# Patient Record
Sex: MALE | Race: WHITE | NOT HISPANIC OR LATINO | URBAN - METROPOLITAN AREA
[De-identification: names, ages, dates, MRNs, and addresses within clinical notes are randomized per-mention and may not be internally consistent; named-entity substitution may affect disease eponyms.]

---

## 2017-07-02 ENCOUNTER — EMERGENCY (EMERGENCY)
Facility: HOSPITAL | Age: 42
LOS: 1 days | Discharge: HOME | End: 2017-07-02
Attending: STUDENT IN AN ORGANIZED HEALTH CARE EDUCATION/TRAINING PROGRAM
Payer: COMMERCIAL

## 2017-07-02 VITALS
HEART RATE: 68 BPM | RESPIRATION RATE: 20 BRPM | SYSTOLIC BLOOD PRESSURE: 121 MMHG | DIASTOLIC BLOOD PRESSURE: 71 MMHG | OXYGEN SATURATION: 97 % | HEIGHT: 74 IN | TEMPERATURE: 98 F | WEIGHT: 179.9 LBS

## 2017-07-02 PROCEDURE — 99284 EMERGENCY DEPT VISIT MOD MDM: CPT | Mod: 25

## 2017-07-02 RX ORDER — GABAPENTIN 400 MG/1
300 CAPSULE ORAL ONCE
Qty: 0 | Refills: 0 | Status: COMPLETED | OUTPATIENT
Start: 2017-07-02 | End: 2017-07-02

## 2017-07-02 RX ORDER — IBUPROFEN 200 MG
600 TABLET ORAL ONCE
Qty: 0 | Refills: 0 | Status: COMPLETED | OUTPATIENT
Start: 2017-07-02 | End: 2017-07-02

## 2017-07-02 NOTE — ED PROVIDER NOTE - MEDICAL DECISION MAKING DETAILS
Will check CT scan, blood work,. labs, medicate and re-eval. Will evaluate for cause of tingling in left arm.  Will obtain CT scan and re-eval.

## 2017-07-02 NOTE — ED PROVIDER NOTE - CARE PLAN
Principal Discharge DX:	Tremors of nervous system  Secondary Diagnosis:	Renal failure (ARF), acute on chronic  Secondary Diagnosis:	Hyperglycemia Principal Discharge DX:	Cervical radiculopathy

## 2017-07-02 NOTE — ED PROVIDER NOTE - CRANIAL NERVE AND PUPILLARY EXAM
Tremors to extremities (chronic) Tremors to extremities (chronic). sensation dimiinished left arm forearm downwards towards hands. Strength is symmetric cranial nerves 2-12 intact/Tremors to extremities (chronic). sensation dimiinished left arm forearm downwards towards hands. Strength is symmetric
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
## 2017-07-02 NOTE — ED PROVIDER NOTE - PROGRESS NOTE DETAILS
Patient with difficulty swallowing. Labs and CT are as noted. Will admit. Patient then began with nausea and dry heaving. Repeat Accu-Chek 79. Will do accuckecck every hour for 1-2 test. Dr. Dorota baca.

## 2017-07-03 VITALS
DIASTOLIC BLOOD PRESSURE: 65 MMHG | HEART RATE: 60 BPM | OXYGEN SATURATION: 98 % | RESPIRATION RATE: 16 BRPM | SYSTOLIC BLOOD PRESSURE: 114 MMHG | TEMPERATURE: 98 F

## 2017-07-03 PROCEDURE — 72125 CT NECK SPINE W/O DYE: CPT

## 2017-07-03 PROCEDURE — 99284 EMERGENCY DEPT VISIT MOD MDM: CPT | Mod: 25

## 2017-07-03 PROCEDURE — 72125 CT NECK SPINE W/O DYE: CPT | Mod: 26

## 2017-07-03 RX ORDER — IBUPROFEN 200 MG
1 TABLET ORAL
Qty: 20 | Refills: 0 | OUTPATIENT
Start: 2017-07-03 | End: 2017-07-08

## 2017-07-03 RX ORDER — GABAPENTIN 400 MG/1
1 CAPSULE ORAL
Qty: 15 | Refills: 0 | OUTPATIENT
Start: 2017-07-03 | End: 2017-07-08

## 2017-07-03 RX ADMIN — GABAPENTIN 300 MILLIGRAM(S): 400 CAPSULE ORAL at 00:04

## 2017-07-03 RX ADMIN — Medication 600 MILLIGRAM(S): at 00:04

## 2017-07-03 NOTE — ED ADULT NURSE REASSESSMENT NOTE - NS ED NURSE REASSESS COMMENT FT1
Pt. resting comfortably on stretcher.  S/P CT; awaiting results.  In no apparent distress, will continue to monitor.

## 2017-07-03 NOTE — ED ADULT NURSE NOTE - OBJECTIVE STATEMENT
Pt. complaining of left shoulder pain x 3 days.  Pt. states pain has been persistent sharp pain x 3 days.  Lung sounds clear and equal bilateral.  Denies chest pain.  Assist x 1 with ADL's.  In no apparent distress, will continue to monitor.

## 2017-09-16 ENCOUNTER — INPATIENT (INPATIENT)
Facility: HOSPITAL | Age: 42
LOS: 3 days | Discharge: HOME HEALTH | DRG: 93 | End: 2017-09-20
Attending: HOSPITALIST | Admitting: HOSPITALIST
Payer: COMMERCIAL

## 2017-09-16 VITALS
RESPIRATION RATE: 15 BRPM | TEMPERATURE: 98 F | DIASTOLIC BLOOD PRESSURE: 75 MMHG | HEIGHT: 74 IN | SYSTOLIC BLOOD PRESSURE: 134 MMHG | OXYGEN SATURATION: 98 % | WEIGHT: 179.9 LBS | HEART RATE: 73 BPM

## 2017-09-16 DIAGNOSIS — D64.9 ANEMIA, UNSPECIFIED: ICD-10-CM

## 2017-09-16 DIAGNOSIS — R27.0 ATAXIA, UNSPECIFIED: ICD-10-CM

## 2017-09-16 DIAGNOSIS — R29.90 UNSPECIFIED SYMPTOMS AND SIGNS INVOLVING THE NERVOUS SYSTEM: ICD-10-CM

## 2017-09-16 DIAGNOSIS — F12.10 CANNABIS ABUSE, UNCOMPLICATED: ICD-10-CM

## 2017-09-16 DIAGNOSIS — S06.9X9A UNSPECIFIED INTRACRANIAL INJURY WITH LOSS OF CONSCIOUSNESS OF UNSPECIFIED DURATION, INITIAL ENCOUNTER: ICD-10-CM

## 2017-09-16 DIAGNOSIS — F17.200 NICOTINE DEPENDENCE, UNSPECIFIED, UNCOMPLICATED: ICD-10-CM

## 2017-09-16 DIAGNOSIS — G25.2 OTHER SPECIFIED FORMS OF TREMOR: ICD-10-CM

## 2017-09-16 DIAGNOSIS — Z29.9 ENCOUNTER FOR PROPHYLACTIC MEASURES, UNSPECIFIED: ICD-10-CM

## 2017-09-16 LAB
ALBUMIN SERPL ELPH-MCNC: 4.6 G/DL — SIGNIFICANT CHANGE UP (ref 3.3–5.2)
ALP SERPL-CCNC: 64 U/L — SIGNIFICANT CHANGE UP (ref 40–120)
ALT FLD-CCNC: 13 U/L — SIGNIFICANT CHANGE UP
AMPHET UR-MCNC: NEGATIVE — SIGNIFICANT CHANGE UP
ANION GAP SERPL CALC-SCNC: 13 MMOL/L — SIGNIFICANT CHANGE UP (ref 5–17)
APPEARANCE UR: CLEAR — SIGNIFICANT CHANGE UP
AST SERPL-CCNC: 17 U/L — SIGNIFICANT CHANGE UP
BACTERIA # UR AUTO: ABNORMAL
BARBITURATES UR SCN-MCNC: NEGATIVE — SIGNIFICANT CHANGE UP
BASOPHILS # BLD AUTO: 0 K/UL — SIGNIFICANT CHANGE UP (ref 0–0.2)
BASOPHILS NFR BLD AUTO: 0.4 % — SIGNIFICANT CHANGE UP (ref 0–2)
BENZODIAZ UR-MCNC: POSITIVE
BILIRUB SERPL-MCNC: 0.4 MG/DL — SIGNIFICANT CHANGE UP (ref 0.4–2)
BILIRUB UR-MCNC: NEGATIVE — SIGNIFICANT CHANGE UP
BUN SERPL-MCNC: 14 MG/DL — SIGNIFICANT CHANGE UP (ref 8–20)
CALCIUM SERPL-MCNC: 9.4 MG/DL — SIGNIFICANT CHANGE UP (ref 8.6–10.2)
CHLORIDE SERPL-SCNC: 101 MMOL/L — SIGNIFICANT CHANGE UP (ref 98–107)
CK MB CFR SERPL CALC: 1.4 NG/ML — SIGNIFICANT CHANGE UP (ref 0–6.7)
CK SERPL-CCNC: 233 U/L — HIGH (ref 30–200)
CO2 SERPL-SCNC: 27 MMOL/L — SIGNIFICANT CHANGE UP (ref 22–29)
COCAINE METAB.OTHER UR-MCNC: NEGATIVE — SIGNIFICANT CHANGE UP
COLOR SPEC: SIGNIFICANT CHANGE UP
CREAT SERPL-MCNC: 0.94 MG/DL — SIGNIFICANT CHANGE UP (ref 0.5–1.3)
DIFF PNL FLD: NEGATIVE — SIGNIFICANT CHANGE UP
EOSINOPHIL # BLD AUTO: 0.4 K/UL — SIGNIFICANT CHANGE UP (ref 0–0.5)
EOSINOPHIL NFR BLD AUTO: 5.1 % — HIGH (ref 0–5)
EPI CELLS # UR: SIGNIFICANT CHANGE UP
GLUCOSE SERPL-MCNC: 96 MG/DL — SIGNIFICANT CHANGE UP (ref 70–115)
GLUCOSE UR QL: NEGATIVE MG/DL — SIGNIFICANT CHANGE UP
HCT VFR BLD CALC: 40.2 % — LOW (ref 42–52)
HGB BLD-MCNC: 13.8 G/DL — LOW (ref 14–18)
KETONES UR-MCNC: NEGATIVE — SIGNIFICANT CHANGE UP
LEUKOCYTE ESTERASE UR-ACNC: ABNORMAL
LYMPHOCYTES # BLD AUTO: 2.6 K/UL — SIGNIFICANT CHANGE UP (ref 1–4.8)
LYMPHOCYTES # BLD AUTO: 38.5 % — SIGNIFICANT CHANGE UP (ref 20–55)
MAGNESIUM SERPL-MCNC: 2 MG/DL — SIGNIFICANT CHANGE UP (ref 1.6–2.6)
MCHC RBC-ENTMCNC: 28.3 PG — SIGNIFICANT CHANGE UP (ref 27–31)
MCHC RBC-ENTMCNC: 34.3 G/DL — SIGNIFICANT CHANGE UP (ref 32–36)
MCV RBC AUTO: 82.4 FL — SIGNIFICANT CHANGE UP (ref 80–94)
METHADONE UR-MCNC: NEGATIVE — SIGNIFICANT CHANGE UP
MONOCYTES # BLD AUTO: 0.5 K/UL — SIGNIFICANT CHANGE UP (ref 0–0.8)
MONOCYTES NFR BLD AUTO: 7.9 % — SIGNIFICANT CHANGE UP (ref 3–10)
NEUTROPHILS # BLD AUTO: 3.3 K/UL — SIGNIFICANT CHANGE UP (ref 1.8–8)
NEUTROPHILS NFR BLD AUTO: 48 % — SIGNIFICANT CHANGE UP (ref 37–73)
NITRITE UR-MCNC: NEGATIVE — SIGNIFICANT CHANGE UP
OPIATES UR-MCNC: NEGATIVE — SIGNIFICANT CHANGE UP
PCP SPEC-MCNC: SIGNIFICANT CHANGE UP
PCP UR-MCNC: NEGATIVE — SIGNIFICANT CHANGE UP
PH UR: 6 — SIGNIFICANT CHANGE UP (ref 5–8)
PHOSPHATE SERPL-MCNC: 3.5 MG/DL — SIGNIFICANT CHANGE UP (ref 2.4–4.7)
PLATELET # BLD AUTO: 182 K/UL — SIGNIFICANT CHANGE UP (ref 150–400)
POTASSIUM SERPL-MCNC: 4.6 MMOL/L — SIGNIFICANT CHANGE UP (ref 3.5–5.3)
POTASSIUM SERPL-SCNC: 4.6 MMOL/L — SIGNIFICANT CHANGE UP (ref 3.5–5.3)
PROT SERPL-MCNC: 7.7 G/DL — SIGNIFICANT CHANGE UP (ref 6.6–8.7)
PROT UR-MCNC: NEGATIVE MG/DL — SIGNIFICANT CHANGE UP
RBC # BLD: 4.88 M/UL — SIGNIFICANT CHANGE UP (ref 4.6–6.2)
RBC # FLD: 14.3 % — SIGNIFICANT CHANGE UP (ref 11–15.6)
RBC CASTS # UR COMP ASSIST: SIGNIFICANT CHANGE UP /HPF (ref 0–4)
SODIUM SERPL-SCNC: 141 MMOL/L — SIGNIFICANT CHANGE UP (ref 135–145)
SP GR SPEC: 1.02 — SIGNIFICANT CHANGE UP (ref 1.01–1.02)
THC UR QL: POSITIVE
UROBILINOGEN FLD QL: NEGATIVE MG/DL — SIGNIFICANT CHANGE UP
WBC # BLD: 6.9 K/UL — SIGNIFICANT CHANGE UP (ref 4.8–10.8)
WBC # FLD AUTO: 6.9 K/UL — SIGNIFICANT CHANGE UP (ref 4.8–10.8)
WBC UR QL: SIGNIFICANT CHANGE UP

## 2017-09-16 PROCEDURE — 99285 EMERGENCY DEPT VISIT HI MDM: CPT

## 2017-09-16 PROCEDURE — 70450 CT HEAD/BRAIN W/O DYE: CPT | Mod: 26

## 2017-09-16 PROCEDURE — 93010 ELECTROCARDIOGRAM REPORT: CPT

## 2017-09-16 PROCEDURE — 99223 1ST HOSP IP/OBS HIGH 75: CPT | Mod: AI,GC

## 2017-09-16 RX ORDER — ENOXAPARIN SODIUM 100 MG/ML
40 INJECTION SUBCUTANEOUS DAILY
Qty: 0 | Refills: 0 | Status: DISCONTINUED | OUTPATIENT
Start: 2017-09-16 | End: 2017-09-20

## 2017-09-16 RX ADMIN — Medication 2 MILLIGRAM(S): at 18:21

## 2017-09-16 NOTE — H&P ADULT - PROBLEM SELECTOR PLAN 6
normocytic anemia  Folate/B12, fecal occult  monitor Hgb/Hct encourage smoking cessation  declines patch   consult

## 2017-09-16 NOTE — H&P ADULT - FAMILY HISTORY
Sibling  Still living? Unknown  Family history of other neurological disease, Age at diagnosis: Age Unknown

## 2017-09-16 NOTE — PATIENT PROFILE ADULT. - VISION (WITH CORRECTIVE LENSES IF THE PATIENT USUALLY WEARS THEM):
ptx expresses that he wears glasses/Partially impaired: cannot see medication labels or newsprint, but can see obstacles in path, and the surrounding layout; can count fingers at arm's length
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
## 2017-09-16 NOTE — ED STATDOCS - PROGRESS NOTE DETAILS
41 yo M PT complaining of trmeors x 1 year. PT seen by intake MD, agree with H&P, orders and plan. PE: A&Ox3, tremors mostly of the UE, Eyes: PERRL EOMI, Heart: s1s2 rrr Lungs: CTA B/L. strength in tact b/l UE/LE. sensation intact. A/P: Head CT, medicate and reassess. CT results: show no acute findings.
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
## 2017-09-16 NOTE — H&P ADULT - NSHPPHYSICALEXAM_GEN_ALL_CORE
T(C): 36.8 (09-16-17 @ 22:19), Max: 36.9 (09-16-17 @ 13:31)  HR: 78 (09-16-17 @ 22:19) (55 - 78)  BP: 110/80 (09-16-17 @ 22:19) (103/61 - 134/75)  RR: 18 (09-16-17 @ 22:19) (15 - 20)  SpO2: 97% (09-16-17 @ 22:19) (96% - 100%)  Wt(kg): --    Physical Exam:   GENERAL: NAD, well-groomed, well-developed  HEAD:  Atraumatic, Normocephalic  EYES: EOMI, PERRLA, conjunctiva and sclera clear  ENMT: No tonsillar erythema, exudates, or enlargement; Moist mucous membranes, Good dentition, No lesions  NECK: Supple, No JVD, Normal thyroid  NERVOUS SYSTEM:  Alert & Oriented X3, poor concentration; Motor Strength 4/5 B/L upper and lower extremities; DTRs 2+ intact and symmetric, coarse tremor of B/L UE  CHEST/LUNG: Clear to auscultation bilaterally; No rales, rhonchi, wheezing, or rubs  HEART: Regular rate and rhythm; No murmurs, rubs, or gallops  ABDOMEN: Soft, Nontender, Nondistended; Bowel sounds present  EXTREMITIES:  2+ Peripheral Pulses, No clubbing, cyanosis, or edema  LYMPH: No lymphadenopathy noted  SKIN: No rashes or lesions T(F): 98.3  HR: 78   BP: 110/80   RR: 18   SpO2: 97% on RA  Wt(kg): --    Physical Exam:   GENERAL: NAD, well-groomed, well-developed, 2.5 cm x 3 cm hematoma in occipital region   HEAD:  Atraumatic, Normocephalic  EYES: EOMI, PERRLA, conjunctiva and sclera clear  ENMT: No tonsillar erythema, exudates, or enlargement; Moist mucous membranes, Good dentition, No lesions  NECK: Supple, No JVD, Normal thyroid  NERVOUS SYSTEM:  Alert & Oriented X3, poor concentration; Motor Strength 4/5 B/L upper and lower extremities; DTRs 2+ intact and symmetric, coarse tremor of B/L UE, negative heel to shin, negative Romberg test, ataxia   CHEST/LUNG: Clear to auscultation bilaterally; No rales, rhonchi, wheezing, or rubs  HEART: Regular rate and rhythm; No murmurs, rubs, or gallops  ABDOMEN: Soft, Nontender, Nondistended; Bowel sounds present  EXTREMITIES:  2+ Peripheral Pulses, No clubbing, cyanosis, or edema, coarse tremors UE B/L noted  LYMPH: No lymphadenopathy noted  SKIN: No rashes or lesions T(F): 98.3  HR: 78   BP: 110/80   RR: 18   SpO2: 97% on RA  Wt(kg): --    Physical Exam:   GENERAL: NAD, well-groomed, drowsy, well-developed, 2.5 cm x 3 cm hematoma in occipital region of head   HEAD:  Atraumatic, Normocephalic  EYES: EOMI, PERRLA, conjunctiva and sclera clear  ENMT: No tonsillar erythema, exudates, or enlargement; Moist mucous membranes, Good dentition, No lesions  NECK: Supple, No JVD, Normal thyroid  NERVOUS SYSTEM:  Alert & Oriented X3, poor concentration; Motor Strength 4/5 B/L upper and lower extremities; DTRs 2+ intact and symmetric, coarse tremor of B/L UE, negative heel to shin, negative Romberg test, significant ataxia, tremors resolves at rest.   CHEST/LUNG: Clear to auscultation bilaterally; No rales, rhonchi, wheezing, or rubs  HEART: Regular rate and rhythm; No murmurs, rubs, or gallops  ABDOMEN: Soft, Nontender, Nondistended; Bowel sounds present  EXTREMITIES:  2+ Peripheral Pulses, No clubbing, cyanosis, or edema, coarse tremors UE B/L noted  LYMPH: No lymphadenopathy noted  SKIN: No rashes or lesions T(F): 98.3  HR: 78   BP: 110/80   RR: 18   SpO2: 97% on RA  Wt(kg): --    Physical Exam:   GENERAL: NAD, well-groomed, drowsy, well-developed, 2.5 cm x 3 cm hematoma in occipital region of head   HEAD: Atraumatic, normocephalic  EYES: EOMI, PERRLA, conjunctiva and sclera clear  ENMT: No tonsillar erythema, exudates, or enlargement; Moist mucous membranes, Good dentition, No lesions  NECK: Supple, No JVD, Normal thyroid  NERVOUS SYSTEM:  Alert & Oriented X3, poor concentration; Motor Strength 4/5 B/L upper and lower extremities; DTRs 2+ intact and symmetric, coarse tremor of B/L UE, negative heel to shin, negative Romberg test, significant ataxia, tremors resolves at rest.   CHEST/LUNG: Clear to auscultation bilaterally; No rales, rhonchi, wheezing, or rubs  HEART: Regular rate and rhythm; No murmurs, rubs, or gallops  ABDOMEN: Soft, Nontender, Nondistended; Bowel sounds present  EXTREMITIES:  2+ Peripheral Pulses, No clubbing, cyanosis, or edema, coarse tremors UE B/L noted  LYMPH: No lymphadenopathy noted  SKIN: No rashes or lesions

## 2017-09-16 NOTE — H&P ADULT - PROBLEM SELECTOR PLAN 1
Admit to medicine-resident service under Dr. Mcrae  Bed: Any  Diet: regular  Activity: ambulate w/ assistance  Nursing: fall risk protocol, supervised meals, vitals per routine  Neurology & psych consult.  CBC, CMP, Mg, Phos, TSH, Toxicology Admit to medicine-resident service under Dr. Mcrae  Bed: Any  Diet: regular  Activity: ambulate w/ assistance  Nursing: fall risk protocol, supervised meals, vitals per routine  Neurology & psych consult.  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 Mcrae  Bed: Any  Diet: regular  Activity: ambulate w/ assistance  Nursing: fall risk protocol, supervised meals, vitals per routine  Neurology & psych consult.  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 Mcrae  bed: Any  diet: regular  activity: ambulate w/ assistance  nursing: fall risk protocol, supervised meals, vitals per routine  Neurology & psych consult  CBC, CMP, Mg, Phos, TSH, Toxicology, Blood alcohol, Folate/B12, serum ceruloplasmin & 24-hour urine copper  Brain MRI in AM

## 2017-09-16 NOTE — ED STATDOCS - MEDICAL DECISION MAKING DETAILS
persistent tremors unable to ambulate will admit for further workup too unsafe to go hoem cinssitently falling at home
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
## 2017-09-16 NOTE — H&P ADULT - NSHPREVIEWOFSYSTEMS_GEN_ALL_CORE
positive: tremors B/L upper & lower extremities, difficulty walking  negative: headache, memory loss, motor or sensory loss, fever, chills, chest pain, palpitation, SOB, bowel or urinary changes, sick contacts. positive: tremors B/L upper & lower extremities, difficulty walking  negative: headache, memory loss, blurry vision, motor or sensory loss, fever, chills, chest pain, palpitation, SOB, bowel or urinary changes, sick contacts.

## 2017-09-16 NOTE — ED STATDOCS - ATTENDING CONTRIBUTION TO CARE
I, Armin Cassidy, performed the initial face to face bedside interview with this patient regarding history of present illness, review of symptoms and relevant past medical, social and family history.  I completed an independent physical examination.  I was the initial provider who evaluated this patient.  The history, relevant review of systems, past medical and surgical history, medical decision making, and physical examination was documented by the scribe in my presence and I attest to the accuracy of the documentation.  I have signed out the follow up of any pending tests (i.e. labs, radiological studies) to the ACP.  I have communicated the patient’s plan of care and disposition with the ACP.
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
## 2017-09-16 NOTE — PATIENT PROFILE ADULT. - PRO ANTICIPATED DISCH DISP
rehabilitation facility/home w/ assist/home w/ home health/home w/ outpatient services/home/extended care facility/unsure

## 2017-09-16 NOTE — H&P ADULT - NSHPATTENDINGPLANDISCUSS_GEN_ALL_CORE
SROC (Dr. Mcgovern) & Attending (Dr. Ty) SROC (Dr. Mcgovern) & Attending (Dr. Ty) - ED attending, pt, family @ bedside
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
## 2017-09-16 NOTE — H&P ADULT - NSHPLABSRESULTS_GEN_ALL_CORE
EKG personally reviewed and my interpretation is: NSR @ 60bpm, normal axis, normal rhythm, intervals within normal limits, no significant chamber enlargement, no ST segment abnormalities, no clinically significant Q waves

## 2017-09-16 NOTE — H&P ADULT - NSHPSOCIALHISTORY_GEN_ALL_CORE
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
## 2017-09-16 NOTE — ED ADULT NURSE REASSESSMENT NOTE - NS ED NURSE REASSESS COMMENT FT1
Pt unable to do CT due to tremors. Khanh ESCALONA aware, new orders given. Pt AOx3, resp even unlabored, will continue to monitor

## 2017-09-17 DIAGNOSIS — D64.9 ANEMIA, UNSPECIFIED: ICD-10-CM

## 2017-09-17 DIAGNOSIS — Z87.820 PERSONAL HISTORY OF TRAUMATIC BRAIN INJURY: ICD-10-CM

## 2017-09-17 DIAGNOSIS — F12.10 CANNABIS ABUSE, UNCOMPLICATED: ICD-10-CM

## 2017-09-17 DIAGNOSIS — F43.20 ADJUSTMENT DISORDER, UNSPECIFIED: ICD-10-CM

## 2017-09-17 LAB
ALBUMIN SERPL ELPH-MCNC: 4.2 G/DL — SIGNIFICANT CHANGE UP (ref 3.3–5.2)
ALP SERPL-CCNC: 59 U/L — SIGNIFICANT CHANGE UP (ref 40–120)
ALT FLD-CCNC: 11 U/L — SIGNIFICANT CHANGE UP
ANION GAP SERPL CALC-SCNC: 9 MMOL/L — SIGNIFICANT CHANGE UP (ref 5–17)
AST SERPL-CCNC: 15 U/L — SIGNIFICANT CHANGE UP
BASOPHILS # BLD AUTO: 0 K/UL — SIGNIFICANT CHANGE UP (ref 0–0.2)
BASOPHILS NFR BLD AUTO: 0.6 % — SIGNIFICANT CHANGE UP (ref 0–2)
BILIRUB SERPL-MCNC: 0.6 MG/DL — SIGNIFICANT CHANGE UP (ref 0.4–2)
BUN SERPL-MCNC: 12 MG/DL — SIGNIFICANT CHANGE UP (ref 8–20)
CALCIUM SERPL-MCNC: 9.3 MG/DL — SIGNIFICANT CHANGE UP (ref 8.6–10.2)
CHLORIDE SERPL-SCNC: 104 MMOL/L — SIGNIFICANT CHANGE UP (ref 98–107)
CO2 SERPL-SCNC: 27 MMOL/L — SIGNIFICANT CHANGE UP (ref 22–29)
CREAT SERPL-MCNC: 0.86 MG/DL — SIGNIFICANT CHANGE UP (ref 0.5–1.3)
EOSINOPHIL # BLD AUTO: 0.4 K/UL — SIGNIFICANT CHANGE UP (ref 0–0.5)
EOSINOPHIL NFR BLD AUTO: 6.7 % — HIGH (ref 0–6)
FOLATE SERPL-MCNC: 9.7 NG/ML — SIGNIFICANT CHANGE UP (ref 4–16)
GLUCOSE SERPL-MCNC: 90 MG/DL — SIGNIFICANT CHANGE UP (ref 70–115)
HCT VFR BLD CALC: 39.3 % — LOW (ref 42–52)
HGB BLD-MCNC: 13.4 G/DL — LOW (ref 14–18)
LYMPHOCYTES # BLD AUTO: 2.1 K/UL — SIGNIFICANT CHANGE UP (ref 1–4.8)
LYMPHOCYTES # BLD AUTO: 32.5 % — SIGNIFICANT CHANGE UP (ref 20–55)
MAGNESIUM SERPL-MCNC: 1.9 MG/DL — SIGNIFICANT CHANGE UP (ref 1.6–2.6)
MCHC RBC-ENTMCNC: 27.9 PG — SIGNIFICANT CHANGE UP (ref 27–31)
MCHC RBC-ENTMCNC: 34.1 G/DL — SIGNIFICANT CHANGE UP (ref 32–36)
MCV RBC AUTO: 81.7 FL — SIGNIFICANT CHANGE UP (ref 80–94)
MONOCYTES # BLD AUTO: 0.6 K/UL — SIGNIFICANT CHANGE UP (ref 0–0.8)
MONOCYTES NFR BLD AUTO: 9.9 % — SIGNIFICANT CHANGE UP (ref 3–10)
NEUTROPHILS # BLD AUTO: 3.3 K/UL — SIGNIFICANT CHANGE UP (ref 1.8–8)
NEUTROPHILS NFR BLD AUTO: 50.3 % — SIGNIFICANT CHANGE UP (ref 37–73)
PHOSPHATE SERPL-MCNC: 2.9 MG/DL — SIGNIFICANT CHANGE UP (ref 2.4–4.7)
PLATELET # BLD AUTO: 188 K/UL — SIGNIFICANT CHANGE UP (ref 150–400)
POTASSIUM SERPL-MCNC: 4.1 MMOL/L — SIGNIFICANT CHANGE UP (ref 3.5–5.3)
POTASSIUM SERPL-SCNC: 4.1 MMOL/L — SIGNIFICANT CHANGE UP (ref 3.5–5.3)
PROT SERPL-MCNC: 6.7 G/DL — SIGNIFICANT CHANGE UP (ref 6.6–8.7)
RBC # BLD: 4.81 M/UL — SIGNIFICANT CHANGE UP (ref 4.6–6.2)
RBC # FLD: 14.4 % — SIGNIFICANT CHANGE UP (ref 11–15.6)
SODIUM SERPL-SCNC: 140 MMOL/L — SIGNIFICANT CHANGE UP (ref 135–145)
T4 AB SER-ACNC: 5.9 UG/DL — SIGNIFICANT CHANGE UP (ref 4.5–12)
TSH SERPL-MCNC: 1.73 UIU/ML — SIGNIFICANT CHANGE UP (ref 0.27–4.2)
VIT B12 SERPL-MCNC: 589 PG/ML — SIGNIFICANT CHANGE UP (ref 180–914)
WBC # BLD: 6.6 K/UL — SIGNIFICANT CHANGE UP (ref 4.8–10.8)
WBC # FLD AUTO: 6.6 K/UL — SIGNIFICANT CHANGE UP (ref 4.8–10.8)

## 2017-09-17 PROCEDURE — 99231 SBSQ HOSP IP/OBS SF/LOW 25: CPT

## 2017-09-17 PROCEDURE — 70450 CT HEAD/BRAIN W/O DYE: CPT | Mod: 26

## 2017-09-17 PROCEDURE — 99233 SBSQ HOSP IP/OBS HIGH 50: CPT | Mod: GC

## 2017-09-17 PROCEDURE — 70551 MRI BRAIN STEM W/O DYE: CPT | Mod: 26

## 2017-09-17 RX ORDER — PROPRANOLOL HCL 160 MG
40 CAPSULE, EXTENDED RELEASE 24HR ORAL
Qty: 0 | Refills: 0 | Status: DISCONTINUED | OUTPATIENT
Start: 2017-09-17 | End: 2017-09-20

## 2017-09-17 RX ORDER — PRIMIDONE 250 MG/1
25 TABLET ORAL AT BEDTIME
Qty: 0 | Refills: 0 | Status: DISCONTINUED | OUTPATIENT
Start: 2017-09-17 | End: 2017-09-20

## 2017-09-17 RX ADMIN — Medication 40 MILLIGRAM(S): at 17:35

## 2017-09-17 RX ADMIN — PRIMIDONE 25 MILLIGRAM(S): 250 TABLET ORAL at 22:41
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
## 2017-09-17 NOTE — BEHAVIORAL HEALTH ASSESSMENT NOTE - DIFFERENTIAL
Adjustment disorder with anxiety versus Generalized Anxiety Disorder  Depression  Cannabis use disorder

## 2017-09-17 NOTE — CONSULT NOTE ADULT - PROBLEM SELECTOR RECOMMENDATION 9
Ataxia, r/o spinal stenosis/CVA. can not rule out congenital cerebellar degeneration.   Suggest MRI of brain with JAIME and with diffusion.  MRI of cervical spine and lumbar spine. EEG. Try mysoline 50 mg 1/2 tab at time. Medical treatment.

## 2017-09-17 NOTE — BEHAVIORAL HEALTH ASSESSMENT NOTE - NSBHCONSULTFOLLOWAFTERCARE_PSY_A_CORE FT
Patient can benefit from therapy in coping with stressors related to medical condition, along with supportive resources for TBI patients.
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
## 2017-09-17 NOTE — PROGRESS NOTE ADULT - PROBLEM SELECTOR PLAN 1
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 Mcrae  Bed: Any  Diet: regular  Activity: ambulate w/ assistance  Nursing: fall risk protocol, supervised meals, vitals per routine  Neurology & psych consult.  

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
## 2017-09-17 NOTE — BEHAVIORAL HEALTH ASSESSMENT NOTE - NSBHSUICPROTECTFACT_PSY_A_CORE
Positive therapeutic relationships/Identifies reasons for living/Future oriented/Engaged in work or school/Responsibility to family and others/Supportive social network or family/Fear of death or dying due to pain/suffering/Ability to cope with stress

## 2017-09-17 NOTE — PROGRESS NOTE ADULT - SUBJECTIVE AND OBJECTIVE BOX
INTERVAL HPI/OVERNIGHT EVENTS:  Patient is a 42y Male admitted with chief complaint of Tremors (16 Sep 2017 22:36)    Patient seen and examined at bedside, No acute overnight events. Patient stated that there's no change in the tremors.   Patient is non-ambulating, tolerating PO, voiding and stooling without any difficulties.      ROS: denies fever, chills, chest pain, SOB, abdominal pain, diarrhea, calf pain.    Medications:  MEDICATIONS  (STANDING):  enoxaparin Injectable 40 milliGRAM(s) SubCutaneous daily    MEDICATIONS  (PRN):      Allergies    No Known Allergies    Intolerances        VS:   Vital Signs Last 24 Hrs  T(C): 36.8 (16 Sep 2017 22:19), Max: 36.9 (16 Sep 2017 13:31)  T(F): 98.3 (16 Sep 2017 22:19), Max: 98.4 (16 Sep 2017 13:31)  HR: 78 (16 Sep 2017 22:19) (55 - 78)  BP: 110/80 (16 Sep 2017 22:19) (103/61 - 134/75)  BP(mean): --  RR: 18 (16 Sep 2017 22:19) (15 - 20)  SpO2: 97% (16 Sep 2017 22:19) (96% - 100%)    Physical Exam:   GENERAL: NAD, well-groomed, drowsy, well-developed, 2.5 cm x 3 cm hematoma in occipital region of head   HEAD: Atraumatic, normocephalic  EYES: EOMI, PERRLA, conjunctiva and sclera clear  ENMT: No tonsillar erythema, exudates, or enlargement; Moist mucous membranes, Good dentition, No lesions  NECK: Supple, No JVD, Normal thyroid  CHEST/LUNG: Clear to auscultation bilaterally; No rales, rhonchi, wheezing, or rubs  HEART: Regular rate and rhythm; No murmurs, rubs, or gallops  ABDOMEN: Soft, Nontender, Nondistended; Bowel sounds present  EXTREMITIES:  2+ Peripheral Pulses, No clubbing, cyanosis, or edema, coarse tremors UE B/L noted  SKIN: No rashes or lesions  NERVOUS SYSTEM:  Alert & Oriented X3, poor concentration; Motor Strength 4/5 B/L upper and lower extremities; DTRs 2+ intact and symmetric, coarse tremor of B/L UE, negative heel to shin, negative Romberg test, significant ataxia, tremors resolves at rest.     Labs:                        13.8   6.9   )-----------( 182      ( 16 Sep 2017 15:54 )             40.2   09-16    141  |  101  |  14.0  ----------------------------<  96  4.6   |  27.0  |  0.94    Ca    9.4      16 Sep 2017 15:54  Phos  3.5     09-16  Mg     2.0     09-16    TPro  7.7  /  Alb  4.6  /  TBili  0.4  /  DBili  x   /  AST  17  /  ALT  13  /  AlkPhos  64  09-16      CAPILLARY BLOOD GLUCOSE        Radiology: INTERVAL HPI/OVERNIGHT EVENTS:  Patient is a 42y Male admitted with chief complaint of Tremors (16 Sep 2017 22:36)    Patient seen and examined at bedside, No acute overnight events. Patient stated that there's no change in the tremors.   Patient is ambulating w/ assistance, tolerating PO, voiding and stooling without any difficulties.      ROS: denies fever, chills, chest pain, SOB, abdominal pain, diarrhea, calf pain.    Medications:  MEDICATIONS  (STANDING):  enoxaparin Injectable 40 milliGRAM(s) SubCutaneous daily    MEDICATIONS  (PRN):      Allergies    No Known Allergies    Intolerances     Vital Signs Last 24 Hrs  T(C): 36.8 (16 Sep 2017 22:19), Max: 36.9 (16 Sep 2017 13:31)  T(F): 98.3 (16 Sep 2017 22:19), Max: 98.4 (16 Sep 2017 13:31)  HR: 78 (16 Sep 2017 22:19) (55 - 78)  BP: 110/80 (16 Sep 2017 22:19) (103/61 - 134/75)  BP(mean): --  RR: 18 (16 Sep 2017 22:19) (15 - 20)  SpO2: 97% (16 Sep 2017 22:19) (96% - 100%)    Physical Exam:   GENERAL: NAD, well-groomed, drowsy, well-developed, 2.5 cm x 3 cm hematoma in occipital region of head   HEAD: Atraumatic, normocephalic  EYES: EOMI, PERRLA, conjunctiva and sclera clear  ENMT: No tonsillar erythema, exudates, or enlargement; Moist mucous membranes, Good dentition, No lesions  NECK: Supple, No JVD, Normal thyroid  CHEST/LUNG: Clear to auscultation bilaterally; No rales, rhonchi, wheezing, or rubs  HEART: Regular rate and rhythm; No murmurs, rubs, or gallops  ABDOMEN: Soft, Nontender, Nondistended; Bowel sounds present  EXTREMITIES:  2+ Peripheral Pulses, No clubbing, cyanosis, or edema, coarse tremors UE B/L noted  SKIN: No rashes or lesions  NERVOUS SYSTEM:  Alert & Oriented X3, poor concentration; Motor Strength 4/5 B/L upper and lower extremities; DTRs 2+ intact and symmetric, coarse tremor of B/L UE, negative heel to shin, negative Romberg test, significant ataxia, tremors resolves at rest.     Labs:                        13.8   6.9   )-----------( 182      ( 16 Sep 2017 15:54 )             40.2   09-16    141  |  101  |  14.0  ----------------------------<  96  4.6   |  27.0  |  0.94    Ca    9.4      16 Sep 2017 15:54  Phos  3.5     09-16  Mg     2.0     09-16    TPro  7.7  /  Alb  4.6  /  TBili  0.4  /  DBili  x   /  AST  17  /  ALT  13  /  AlkPhos  64  09-16      CAPILLARY BLOOD GLUCOSE        Radiology:

## 2017-09-18 LAB
BASOPHILS # BLD AUTO: 0 K/UL — SIGNIFICANT CHANGE UP (ref 0–0.2)
BASOPHILS NFR BLD AUTO: 0.3 % — SIGNIFICANT CHANGE UP (ref 0–2)
CERULOPLASMIN SERPL-MCNC: 24 MG/DL — SIGNIFICANT CHANGE UP (ref 20–60)
CK MB CFR SERPL CALC: 1.5 NG/ML — SIGNIFICANT CHANGE UP (ref 0–6.7)
CK SERPL-CCNC: 242 U/L — HIGH (ref 30–200)
EOSINOPHIL # BLD AUTO: 0.4 K/UL — SIGNIFICANT CHANGE UP (ref 0–0.5)
EOSINOPHIL NFR BLD AUTO: 7.4 % — HIGH (ref 0–6)
HCT VFR BLD CALC: 40.7 % — LOW (ref 42–52)
HGB BLD-MCNC: 13.9 G/DL — LOW (ref 14–18)
LYMPHOCYTES # BLD AUTO: 1.9 K/UL — SIGNIFICANT CHANGE UP (ref 1–4.8)
LYMPHOCYTES # BLD AUTO: 32.3 % — SIGNIFICANT CHANGE UP (ref 20–55)
MCHC RBC-ENTMCNC: 28.2 PG — SIGNIFICANT CHANGE UP (ref 27–31)
MCHC RBC-ENTMCNC: 34.2 G/DL — SIGNIFICANT CHANGE UP (ref 32–36)
MCV RBC AUTO: 82.6 FL — SIGNIFICANT CHANGE UP (ref 80–94)
MONOCYTES # BLD AUTO: 0.6 K/UL — SIGNIFICANT CHANGE UP (ref 0–0.8)
MONOCYTES NFR BLD AUTO: 9.8 % — SIGNIFICANT CHANGE UP (ref 3–10)
NEUTROPHILS # BLD AUTO: 3 K/UL — SIGNIFICANT CHANGE UP (ref 1.8–8)
NEUTROPHILS NFR BLD AUTO: 50.2 % — SIGNIFICANT CHANGE UP (ref 37–73)
PLATELET # BLD AUTO: 191 K/UL — SIGNIFICANT CHANGE UP (ref 150–400)
RBC # BLD: 4.93 M/UL — SIGNIFICANT CHANGE UP (ref 4.6–6.2)
RBC # FLD: 14.6 % — SIGNIFICANT CHANGE UP (ref 11–15.6)
WBC # BLD: 5.9 K/UL — SIGNIFICANT CHANGE UP (ref 4.8–10.8)
WBC # FLD AUTO: 5.9 K/UL — SIGNIFICANT CHANGE UP (ref 4.8–10.8)

## 2017-09-18 PROCEDURE — 72141 MRI NECK SPINE W/O DYE: CPT | Mod: 26

## 2017-09-18 PROCEDURE — 99233 SBSQ HOSP IP/OBS HIGH 50: CPT | Mod: GC

## 2017-09-18 PROCEDURE — 72148 MRI LUMBAR SPINE W/O DYE: CPT | Mod: 26

## 2017-09-18 RX ADMIN — Medication 40 MILLIGRAM(S): at 06:54

## 2017-09-18 RX ADMIN — PRIMIDONE 25 MILLIGRAM(S): 250 TABLET ORAL at 22:33

## 2017-09-18 RX ADMIN — Medication 40 MILLIGRAM(S): at 18:17

## 2017-09-18 NOTE — PHYSICAL THERAPY INITIAL EVALUATION ADULT - CRITERIA FOR SKILLED THERAPEUTIC INTERVENTIONS
functional limitations in following categories/impairments found/anticipated equipment needs at discharge/rehab potential/anticipated discharge recommendation

## 2017-09-18 NOTE — PROGRESS NOTE ADULT - PROBLEM SELECTOR PLAN 1
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 bed alarm in place. pt warned to call for help prior to attempting to get up.   Serum Folate/B12 WNL  F/u pending serum ceruloplasmin & urine copper  Continue propranolol 40 mg PO BID. Monitor B.P changes.  Continue Mysoline 50 mg 1/2 tab as per neuro recommendation.  Psych ruled out psychogenic causes.   MRI head done and neg for any cerebellar infarction. Will f/u MRI cervical and lumbar today.
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
## 2017-09-18 NOTE — PROGRESS NOTE ADULT - SUBJECTIVE AND OBJECTIVE BOX
INTERVAL HISTORY:  Seen at bedside; feels better today    No Known Allergies      VITAL SIGNS:  Vital Signs Last 24 Hrs  T(C): 36.8 (18 Sep 2017 07:45), Max: 36.8 (17 Sep 2017 16:22)  T(F): 98.3 (18 Sep 2017 07:45), Max: 98.3 (18 Sep 2017 07:45)  HR: 66 (18 Sep 2017 07:45) (60 - 72)  BP: 106/70 (18 Sep 2017 07:45) (106/70 - 115/60)  BP(mean): --  RR: 18 (18 Sep 2017 07:45) (18 - 18)  SpO2: --    PHYSICAL EXAMINATION:  Patient is alert and oriented x 3. There is no aphasia, there is mild dysarthria. Follows complex commands. Decreased lower quadrants vision.    Pupils are equal and reactive. Extra occular  muscles are intact. There is no facial droop or asymmetry. Tongue is midline.   Sensation is intact in the face. Other CN II-XII are intact. There is tremor of  the  head, bilateral arms greater than legs continuously.  On motor examination all muscles groups are 5/5 except left deltoids at 4+/5 and left iliopsoas at 4+/5.  Sensory is intact to pinprick and touch. DTR are 3/4 all 4 extremities with 3 beat clonus left leg.  Babinski is negative bilateral. significant tremor and dysmetria of bilateral upper with finger to finger .  Romberg is negative.     MEDS:  MEDICATIONS  (STANDING):  enoxaparin Injectable 40 milliGRAM(s) SubCutaneous daily  propranolol 40 milliGRAM(s) Oral two times a day  primidone 25 milliGRAM(s) Oral at bedtime    MEDICATIONS  (PRN):      LABS:                          13.9   5.9   )-----------( 191      ( 18 Sep 2017 08:02 )             40.7     09-17    140  |  104  |  12.0  ----------------------------<  90  4.1   |  27.0  |  0.86    Ca    9.3      17 Sep 2017 07:04  Phos  2.9     09-17  Mg     1.9     09-17    TPro  6.7  /  Alb  4.2  /  TBili  0.6  /  DBili  x   /  AST  15  /  ALT  11  /  AlkPhos  59  09-17    LIVER FUNCTIONS - ( 17 Sep 2017 07:04 )    Alb: 4.2 g/dL / Pro: 6.7 g/dL / ALK PHOS: 59 U/L / ALT: 11 U/L / AST: 15 U/L / GGT: x               RADIOLOGY & ADDITIONAL STUDIES:      MRI Head w/o Cont (09.17.17 @ 12:15)    No acute infarction. Microvascular changes. Sinusitis.  In conjunction with the abnormal cervical spine MRI. The patient's white   matter changes warranted a differential diagnosis which includes   infectious, inflammatory, vascular, autoimmune, and demyelinating   etiologies. Postcontrast images are recommended. Less likely in the   differential diagnosis is neoplasm    MRI Cervical Spine w/o Cont (09.18.17 @ 10:18)   Multifocal areas of abnormal spinal cord hyperintensities. Differential   diagnosis includes infectious, inflammatory, autoimmune, demyelinating,   transverse myelitis, and demyelinating disease. Postcontrast images are   recommended.      MRI Lumbar Spine w/o Cont (09.18.17 @ 10:34)   No acute abnormalities. Degenerative changes as above.        IMPRESSION:  H/o TBI; tremor

## 2017-09-18 NOTE — PHYSICAL THERAPY INITIAL EVALUATION ADULT - ADDITIONAL COMMENTS
Pt reports independent ambulation without assistive device. Pt does not drive, significant other drives and does shopping

## 2017-09-18 NOTE — PROGRESS NOTE ADULT - PROBLEM SELECTOR PLAN 2
Plan as above Bed alarm in place.   Pt warned to call for help prior to attempting to get up.   F/U MRI cervical and lumbar

## 2017-09-19 ENCOUNTER — TRANSCRIPTION ENCOUNTER (OUTPATIENT)
Age: 42
End: 2017-09-19

## 2017-09-19 LAB
CRP SERPL-MCNC: 0.1 MG/DL — SIGNIFICANT CHANGE UP (ref 0–0.4)
ERYTHROCYTE [SEDIMENTATION RATE] IN BLOOD: 4 MM/HR — SIGNIFICANT CHANGE UP (ref 0–20)
RPR SERPL-ACNC: SIGNIFICANT CHANGE UP

## 2017-09-19 PROCEDURE — 97530 THERAPEUTIC ACTIVITIES: CPT

## 2017-09-19 PROCEDURE — 96374 THER/PROPH/DIAG INJ IV PUSH: CPT

## 2017-09-19 PROCEDURE — 85027 COMPLETE CBC AUTOMATED: CPT

## 2017-09-19 PROCEDURE — 96375 TX/PRO/DX INJ NEW DRUG ADDON: CPT

## 2017-09-19 PROCEDURE — 70551 MRI BRAIN STEM W/O DYE: CPT

## 2017-09-19 PROCEDURE — 80307 DRUG TEST PRSMV CHEM ANLYZR: CPT

## 2017-09-19 PROCEDURE — 84443 ASSAY THYROID STIM HORMONE: CPT

## 2017-09-19 PROCEDURE — 97116 GAIT TRAINING THERAPY: CPT

## 2017-09-19 PROCEDURE — 97110 THERAPEUTIC EXERCISES: CPT

## 2017-09-19 PROCEDURE — 72141 MRI NECK SPINE W/O DYE: CPT

## 2017-09-19 PROCEDURE — 70552 MRI BRAIN STEM W/DYE: CPT | Mod: 26

## 2017-09-19 PROCEDURE — 72148 MRI LUMBAR SPINE W/O DYE: CPT

## 2017-09-19 PROCEDURE — 86140 C-REACTIVE PROTEIN: CPT

## 2017-09-19 PROCEDURE — 97163 PT EVAL HIGH COMPLEX 45 MIN: CPT

## 2017-09-19 PROCEDURE — 85652 RBC SED RATE AUTOMATED: CPT

## 2017-09-19 PROCEDURE — 93005 ELECTROCARDIOGRAM TRACING: CPT

## 2017-09-19 PROCEDURE — 82607 VITAMIN B-12: CPT

## 2017-09-19 PROCEDURE — 83735 ASSAY OF MAGNESIUM: CPT

## 2017-09-19 PROCEDURE — 82550 ASSAY OF CK (CPK): CPT

## 2017-09-19 PROCEDURE — 99285 EMERGENCY DEPT VISIT HI MDM: CPT | Mod: 25

## 2017-09-19 PROCEDURE — 70552 MRI BRAIN STEM W/DYE: CPT

## 2017-09-19 PROCEDURE — 82390 ASSAY OF CERULOPLASMIN: CPT

## 2017-09-19 PROCEDURE — 82746 ASSAY OF FOLIC ACID SERUM: CPT

## 2017-09-19 PROCEDURE — 84100 ASSAY OF PHOSPHORUS: CPT

## 2017-09-19 PROCEDURE — 82525 ASSAY OF COPPER: CPT

## 2017-09-19 PROCEDURE — 82553 CREATINE MB FRACTION: CPT

## 2017-09-19 PROCEDURE — 81001 URINALYSIS AUTO W/SCOPE: CPT

## 2017-09-19 PROCEDURE — 36415 COLL VENOUS BLD VENIPUNCTURE: CPT

## 2017-09-19 PROCEDURE — 80053 COMPREHEN METABOLIC PANEL: CPT

## 2017-09-19 PROCEDURE — 86592 SYPHILIS TEST NON-TREP QUAL: CPT

## 2017-09-19 PROCEDURE — 99233 SBSQ HOSP IP/OBS HIGH 50: CPT | Mod: GC

## 2017-09-19 PROCEDURE — 84436 ASSAY OF TOTAL THYROXINE: CPT

## 2017-09-19 PROCEDURE — 70450 CT HEAD/BRAIN W/O DYE: CPT

## 2017-09-19 PROCEDURE — 72142 MRI NECK SPINE W/DYE: CPT | Mod: 26

## 2017-09-19 PROCEDURE — 72142 MRI NECK SPINE W/DYE: CPT

## 2017-09-19 RX ADMIN — PRIMIDONE 25 MILLIGRAM(S): 250 TABLET ORAL at 22:06

## 2017-09-19 RX ADMIN — Medication 40 MILLIGRAM(S): at 06:47

## 2017-09-19 NOTE — DISCHARGE NOTE ADULT - PROVIDER TOKENS
TOKEN:'[id number]',FREE:[LAST:[[place]],FIRST:[MD Moon],PHONE:[phone]],FAX:[(   [phone]    -],ADDRESS:[[address] [place] [address]]]
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
## 2017-09-19 NOTE — DISCHARGE NOTE ADULT - CARE PROVIDER_API CALL
Jeffrey Amaral (MD), Neurology  [address] [place] [address]  Phone: [phone]  Fax: [phone]    Moon Galo MD  [address] [place] [address]  Phone: [phone]  Fax: (   )    -

## 2017-09-19 NOTE — DISCHARGE NOTE ADULT - PLAN OF CARE
Resolution of tremors Continue to take propranolol and primidone as prescribed. Follow up with your neurologist in 1 week. Control of symptoms Follow up with your neurologist. Adjustment Consider individual or group therapy Abstinence Consider individual or group therapy with other people with brain injury Consider abstaining from use of marijuana. Resolution You have mild anemia. Eat foods high in iron.

## 2017-09-19 NOTE — DISCHARGE NOTE ADULT - PATIENT PORTAL LINK FT
“You can access the [facility] Patient Portal, offered by [facility], by registering with the following website: [email or web address]”
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
## 2017-09-19 NOTE — PROGRESS NOTE ADULT - SUBJECTIVE AND OBJECTIVE BOX
Patient is a 42y Male who presents with a chief complaint of Tremors (16 Sep 2017 22:36).    Patient seen and examined at bedside, No acute overnight events. Pt says symptoms generally unimproved despite starting b-blockers. He says he continues to have tremors at rest and on reaching out to grasp things. He also c/o weakness of LLE more than R. He has no other complaint at this time. Pt denies fever, chest pain, SOB, abdominal pain, calf pain. Patient attempts ambulating by self though gait unsteady. Bed alarm in place to monitor pt.     ROS: Pt denies fever, chest pain, SOB, abdominal pain, calf pain.    Vital Signs Last 24 Hrs  T(C): 36.8 (18 Sep 2017 23:51), Max: 36.8 (18 Sep 2017 07:45)  T(F): 98.2 (18 Sep 2017 23:51), Max: 98.3 (18 Sep 2017 07:45)  HR: 58 (18 Sep 2017 23:51) (57 - 66)  BP: 100/62 (18 Sep 2017 23:51) (100/62 - 116/78)  BP(mean): --  RR: 18 (18 Sep 2017 23:51) (18 - 18)  SpO2: 98% (18 Sep 2017 23:51) (98% - 98%)      PHYSICAL EXAM:  General: Appears well developed, well nourished alert and cooperative.  HEENT: Head; normocephalic, atraumatic.  Eyes: PERRLA, EOMI  Neck: Supple, no JVD or carotid bruit or thyromegaly.  CARDIOVASCULAR: Normal S1 and S2, No murmur, RRR.   LUNGS: No rales, rhonchi or wheeze. Normal breath sounds bilaterally.  ABDOMEN: Soft, nontender without mass or organomegaly. Nl BS  EXTREMITIES: No clubbing, cyanosis or edema. Distal pulses wnl.   SKIN: warm and dry with normal turgor.  NEURO: Alert/oriented x 3. Tremors noted at rest and worse with attempt to grasp. Reflexes intact. able to perform finger to nose test though with some difficulty. sensations intact. 4/5 muscle strength on B/L UE and Rt LE. 3/5 strength on Left LE.      Labs:                                   13.9   5.9   )-----------( 191      ( 18 Sep 2017 08:02 )             40.7   09-17    140  |  104  |  12.0  ----------------------------<  90  4.1   |  27.0  |  0.86    Ca    9.3      17 Sep 2017 07:04  Phos  2.9     09-17  Mg     1.9     09-17    TPro  6.7  /  Alb  4.2  /  TBili  0.6  /  DBili  x   /  AST  15  /  ALT  11  /  AlkPhos  59  09-17      Radiology:     EXAM:  BRAIN (MRI) W O CON                          PROCEDURE DATE:  09/17/2017          INTERPRETATION:  CLINICAL HISTORY: tremor    COMPARISON: CT head dated 9/17/2017    TECHNIQUE: MRI brain: Multiplanar, multisequence MR imaging of the brain   are obtained without the administration of intravenous gadolinium.     FINDINGS: Limited by motion artifact.  There is no abnormal restricted diffusion to suggest acute infarction.   Scattered periventricular and subcortical white matter T2 /FLAIR   hyperintensities are seen without mass effect, nonspecific, likely   representing moderate to severe chronic microvascular changes. The   visualized hippocampal structures are symmetric in morphology and signal   intensity.    Normal T2 flow-voids are seen within  the intracranial vasculature. The   lateral ventricles and cortical sulci are age-appropriate in size and   configuration. There is no mass, mass effect, or extra-axial fluid   collection. There is no susceptibility artifact to suggest hemorrhage.   Midline structures are normal.  Mild polypoid inflammatory mucosal   changes are seen throughout the various portions of the paranasal   sinuses. The orbits and mastoid air cells are unremarkable.     IMPRESSION: No acute infarction. Microvascular changes. Sinusitis.    < from: MRI Cervical Spine w/o Cont (09.18.17 @ 10:18) >    IMPRESSION:    Multifocal areas of abnormal spinal cord hyperintensities. Differential   diagnosis includes infectious, inflammatory, autoimmune, demyelinating,   transverse myelitis, and demyelinating disease. Postcontrast images are   recommended.    < end of copied text >      MEDICATIONS  (STANDING):  enoxaparin Injectable 40 milliGRAM(s) SubCutaneous daily  propranolol 40 milliGRAM(s) Oral two times a day  primidone 25 milliGRAM(s) Oral at bedtime    MEDICATIONS  (PRN): Patient is a 42y Male who presents with a chief complaint of Tremors (16 Sep 2017 22:36).    Patient seen and examined at bedside, No acute overnight events. Pt says symptoms generally unimproved despite starting b-blockers. He says he continues to have tremors at rest and on reaching out to grasp things. He also c/o weakness of LLE more than R. He has no other complaint at this time. Pt denies fever, chest pain, SOB, abdominal pain, calf pain. Patient attempts ambulating by self though gait unsteady. Bed alarm in place to monitor pt.     ROS: Pt denies fever, chest pain, SOB, abdominal pain, calf pain.    Neuro Consult noted - will f/u with MRI Head/Cervix w/contrast  PT Consult noted - D/C to Rehab or [facility]    Vital Signs Last 24 Hrs  T(C): 36.8 (18 Sep 2017 23:51), Max: 36.8 (18 Sep 2017 07:45)  T(F): 98.2 (18 Sep 2017 23:51), Max: 98.3 (18 Sep 2017 07:45)  HR: 58 (18 Sep 2017 23:51) (57 - 66)  BP: 100/62 (18 Sep 2017 23:51) (100/62 - 116/78)  BP(mean): --  RR: 18 (18 Sep 2017 23:51) (18 - 18)  SpO2: 98% (18 Sep 2017 23:51) (98% - 98%)      PHYSICAL EXAM:  General: Appears well developed, well nourished alert and cooperative.  HEENT: Head; normocephalic, atraumatic.  Eyes: PERRLA, EOMI  Neck: Supple, no JVD or carotid bruit or thyromegaly.  CARDIOVASCULAR: Normal S1 and S2, No murmur, RRR.   LUNGS: No rales, rhonchi or wheeze. Normal breath sounds bilaterally.  ABDOMEN: Soft, nontender without mass or organomegaly. Nl BS  EXTREMITIES: No clubbing, cyanosis or edema. Distal pulses wnl.   SKIN: warm and dry with normal turgor.  NEURO: Alert/oriented x 3. Tremors noted at rest and worse with attempt to grasp. Reflexes intact. able to perform finger to nose test though with some difficulty. sensations intact. 4/5 muscle strength on B/L UE and Rt LE. 3/5 strength on Left LE.      Labs:                                   13.9   5.9   )-----------( 191      ( 18 Sep 2017 08:02 )             40.7   09-17    140  |  104  |  12.0  ----------------------------<  90  4.1   |  27.0  |  0.86    Ca    9.3      17 Sep 2017 07:04  Phos  2.9     09-17  Mg     1.9     09-17    TPro  6.7  /  Alb  4.2  /  TBili  0.6  /  DBili  x   /  AST  15  /  ALT  11  /  AlkPhos  59  09-17      Radiology:     EXAM:  BRAIN (MRI) W O CON                          PROCEDURE DATE:  09/17/2017          INTERPRETATION:  CLINICAL HISTORY: tremor    COMPARISON: CT head dated 9/17/2017    TECHNIQUE: MRI brain: Multiplanar, multisequence MR imaging of the brain   are obtained without the administration of intravenous gadolinium.     FINDINGS: Limited by motion artifact.  There is no abnormal restricted diffusion to suggest acute infarction.   Scattered periventricular and subcortical white matter T2 /FLAIR   hyperintensities are seen without mass effect, nonspecific, likely   representing moderate to severe chronic microvascular changes. The   visualized hippocampal structures are symmetric in morphology and signal   intensity.    Normal T2 flow-voids are seen within  the intracranial vasculature. The   lateral ventricles and cortical sulci are age-appropriate in size and   configuration. There is no mass, mass effect, or extra-axial fluid   collection. There is no susceptibility artifact to suggest hemorrhage.   Midline structures are normal.  Mild polypoid inflammatory mucosal   changes are seen throughout the various portions of the paranasal   sinuses. The orbits and mastoid air cells are unremarkable.     IMPRESSION: No acute infarction. Microvascular changes. Sinusitis.    < from: MRI Cervical Spine w/o Cont (09.18.17 @ 10:18) >    IMPRESSION:    Multifocal areas of abnormal spinal cord hyperintensities. Differential   diagnosis includes infectious, inflammatory, autoimmune, demyelinating,   transverse myelitis, and demyelinating disease. Postcontrast images are   recommended.    < end of copied text >      MEDICATIONS  (STANDING):  enoxaparin Injectable 40 milliGRAM(s) SubCutaneous daily  propranolol 40 milliGRAM(s) Oral two times a day  primidone 25 milliGRAM(s) Oral at bedtime    MEDICATIONS  (PRN): Patient is a 42y Male who presents with a chief complaint of Tremors (16 Sep 2017 22:36).    Patient seen and examined at bedside, No acute overnight events. Pt says symptoms generally unimproved despite starting propranalol and prmidone. He says he continues to have tremors at rest and on reaching out to grasp things. He also c/o weakness of LLE more than R. He has no other complaint at this time. Pt denies fever, chest pain, SOB, abdominal pain, calf pain, extremity pain. Patient attempts ambulating by self though gait unsteady. Bed alarm in place to monitor pt.     ROS: Pt denies fever, chest pain, SOB, abdominal pain, calf pain.    Neuro Consult noted - will f/u with MRI Head/Cervical spine w/contrast  PT Consult noted - D/C to Rehab or [facility]    Vital Signs Last 24 Hrs  T(C): 36.8 (18 Sep 2017 23:51), Max: 36.8 (18 Sep 2017 07:45)  T(F): 98.2 (18 Sep 2017 23:51), Max: 98.3 (18 Sep 2017 07:45)  HR: 58 (18 Sep 2017 23:51) (57 - 66)  BP: 100/62 (18 Sep 2017 23:51) (100/62 - 116/78)  BP(mean): --  RR: 18 (18 Sep 2017 23:51) (18 - 18)  SpO2: 98% (18 Sep 2017 23:51) (98% - 98%)      PHYSICAL EXAM:  General: Appears well developed, well nourished alert and cooperative.  HEENT: Head; normocephalic, atraumatic.  Eyes: PERRLA, EOMI  Neck: Supple, no JVD or carotid bruit or thyromegaly.  CARDIOVASCULAR: Normal S1 and S2, No murmur, RRR.   LUNGS: No rales, rhonchi or wheeze. Normal breath sounds bilaterally.  ABDOMEN: Soft, nontender without mass or organomegaly. Nl BS  EXTREMITIES: No clubbing, cyanosis or edema. Distal pulses wnl.   SKIN: warm and dry with normal turgor.  NEURO: Alert/oriented x 3. Tremors noted at rest and worse with attempt to grasp. Reflexes intact. able to perform finger to nose test though with some difficulty. sensations intact. 4/5 muscle strength on B/L UE and RLE. 3/5 strength on LLE.      Labs:                                   13.9   5.9   )-----------( 191      ( 18 Sep 2017 08:02 )             40.7   09-17    140  |  104  |  12.0  ----------------------------<  90  4.1   |  27.0  |  0.86    Ca    9.3      17 Sep 2017 07:04  Phos  2.9     09-17  Mg     1.9     09-17    TPro  6.7  /  Alb  4.2  /  TBili  0.6  /  DBili  x   /  AST  15  /  ALT  11  /  AlkPhos  59  09-17      Radiology:     EXAM:  BRAIN (MRI) W O CON                          PROCEDURE DATE:  09/17/2017          INTERPRETATION:  CLINICAL HISTORY: tremor    COMPARISON: CT head dated 9/17/2017    TECHNIQUE: MRI brain: Multiplanar, multisequence MR imaging of the brain   are obtained without the administration of intravenous gadolinium.     FINDINGS: Limited by motion artifact.  There is no abnormal restricted diffusion to suggest acute infarction.   Scattered periventricular and subcortical white matter T2 /FLAIR   hyperintensities are seen without mass effect, nonspecific, likely   representing moderate to severe chronic microvascular changes. The   visualized hippocampal structures are symmetric in morphology and signal   intensity.    Normal T2 flow-voids are seen within  the intracranial vasculature. The   lateral ventricles and cortical sulci are age-appropriate in size and   configuration. There is no mass, mass effect, or extra-axial fluid   collection. There is no susceptibility artifact to suggest hemorrhage.   Midline structures are normal.  Mild polypoid inflammatory mucosal   changes are seen throughout the various portions of the paranasal   sinuses. The orbits and mastoid air cells are unremarkable.     IMPRESSION: No acute infarction. Microvascular changes. Sinusitis.    < from: MRI Cervical Spine w/o Cont (09.18.17 @ 10:18) >    IMPRESSION:    Multifocal areas of abnormal spinal cord hyperintensities. Differential   diagnosis includes infectious, inflammatory, autoimmune, demyelinating,   transverse myelitis, and demyelinating disease. Postcontrast images are   recommended.    < end of copied text >      MEDICATIONS  (STANDING):  enoxaparin Injectable 40 milliGRAM(s) SubCutaneous daily  propranolol 40 milliGRAM(s) Oral two times a day  primidone 25 milliGRAM(s) Oral at bedtime    MEDICATIONS  (PRN):

## 2017-09-19 NOTE — PROGRESS NOTE ADULT - PROBLEM SELECTOR PLAN 1
likely secondary to intention tremor  Pt with ataxic gait. bed alarm in place. pt warned to call for help prior to attempting to get up.   Serum Folate/B12 WNL  F/u pending serum ceruloplasmin & urine copper  Continue propranolol 40 mg PO BID. Monitor B.P changes.  Continue Mysoline 50 mg 1/2 tab as per neuro recommendation.  Psych ruled out psychogenic causes.   MRI head done and neg for any cerebellar infarction. Will f/u MRI cervical and lumbar today. likely secondary to intention tremor  Pt with ataxic gait. bed alarm in place. pt warned to call for help prior to attempting to get up.   Serum Folate/B12 WNL  F/u pending serum ceruloplasmin 24 wnl.  Continue propranolol 40 mg PO BID. Monitor B.P changes.  Continue Mysoline 50 mg 1/2 tab (25mg) as per neuro recommendation.  Psych ruled out psychogenic causes.   MRI head done and neg for any cerebellar infarction.   MRI Head/cervical spine w/contrast today

## 2017-09-19 NOTE — DISCHARGE NOTE ADULT - NS AS ACTIVITY OBS
Bathing allowed/Showering allowed/Do not drive or operate machinery/Walking-Outdoors allowed/Walking-Indoors allowed/Stairs allowed

## 2017-09-19 NOTE — PROGRESS NOTE ADULT - SUBJECTIVE AND OBJECTIVE BOX
INTERVAL HISTORY:  Seen at bedside; no new changes.  Not happy about doing MRI's with josselni.    No Known Allergies      VITAL SIGNS:  Vital Signs Last 24 Hrs  T(C): 36.9 (19 Sep 2017 07:39), Max: 36.9 (19 Sep 2017 07:39)  T(F): 98.5 (19 Sep 2017 07:39), Max: 98.5 (19 Sep 2017 07:39)  HR: 57 (19 Sep 2017 07:39) (57 - 60)  BP: 110/64 (19 Sep 2017 07:39) (100/62 - 116/78)  BP(mean): --  RR: 18 (19 Sep 2017 07:39) (18 - 18)  SpO2: 97% (19 Sep 2017 07:39) (97% - 98%)    PHYSICAL EXAMINATION:  General: Well-developed, well nourished, in no acute distress.  Eyes: Conjunctiva and sclera clear.  Neurologic:  - Mental Status:  Alert, awake, oriented to person, place, and time; Speech is normal; Affect is normal.  - Cranial Nerves: II-XI intact.  - Motor:  Strength is 5/5 throughout.  There is no pronator drift.  Normal muscle bulk and tone throughout.  - Reflexes:  2+ throughout  - Sensory:  Intact to light touch, pin prick, vibration, and joint-position sense throughout.  - Coordination:  No dysmetria/dysdiadochokinesis.    MEDS:  MEDICATIONS  (STANDING):  enoxaparin Injectable 40 milliGRAM(s) SubCutaneous daily  propranolol 40 milliGRAM(s) Oral two times a day  primidone 25 milliGRAM(s) Oral at bedtime    MEDICATIONS  (PRN):      LABS:                          13.9   5.9   )-----------( 191      ( 18 Sep 2017 08:02 )             40.7     ESR - 4    RADIOLOGY & ADDITIONAL STUDIES:          IMPRESSION:  H/o TBI with tremors;

## 2017-09-19 NOTE — DISCHARGE NOTE ADULT - SECONDARY DIAGNOSIS.
Traumatic brain injury, without LOC, subsequent encounter Adjustment disorder, unspecified type Cannabis use disorder, mild, abuse Anemia Smoking

## 2017-09-19 NOTE — DISCHARGE NOTE ADULT - MEDICATION SUMMARY - MEDICATIONS TO TAKE
I will START or STAY ON the medications listed below when I get home from the hospital:    Rolling Walker  -- Rolling Walker for ataxia  -- Indication: For Ataxia    propranolol 40 mg oral tablet  -- 1 tab(s) by mouth 2 times a day  -- Indication: For Tremor of both hands    Mysoline 50 mg oral tablet  -- 0.5 tab(s) by mouth once a day (in the evening)   -- Do not drink alcoholic beverages when taking this medication.  It is very important that you take or use this exactly as directed.  Do not skip doses or discontinue unless directed by your doctor.  May cause drowsiness.  Alcohol may intensify this effect.  Use care when operating dangerous machinery.    -- Indication: For Tremor of both hands

## 2017-09-19 NOTE — PROGRESS NOTE ADULT - ATTENDING COMMENTS
Less tremulous today.  Discussed with Neurology - Awaiting contrast imaging.  Discussed with patient, GF at bedside

## 2017-09-19 NOTE — PROGRESS NOTE ADULT - PROBLEM SELECTOR PLAN 2
Bed alarm in place.   Pt warned to call for help prior to attempting to get up.   F/U MRI cervical and lumbar Bed alarm in place.   Pt warned to call for help prior to attempting to get up.   F/U MRI head and cervical spine w/contrast today

## 2017-09-20 VITALS
HEART RATE: 72 BPM | TEMPERATURE: 99 F | DIASTOLIC BLOOD PRESSURE: 70 MMHG | OXYGEN SATURATION: 98 % | RESPIRATION RATE: 16 BRPM | SYSTOLIC BLOOD PRESSURE: 117 MMHG

## 2017-09-20 PROCEDURE — 99239 HOSP IP/OBS DSCHRG MGMT >30: CPT

## 2017-09-20 PROCEDURE — 99222 1ST HOSP IP/OBS MODERATE 55: CPT | Mod: GC

## 2017-09-20 RX ORDER — PRIMIDONE 250 MG/1
0.5 TABLET ORAL
Qty: 15 | Refills: 0 | OUTPATIENT
Start: 2017-09-20 | End: 2017-10-20

## 2017-09-20 RX ORDER — PROPRANOLOL HCL 160 MG
1 CAPSULE, EXTENDED RELEASE 24HR ORAL
Qty: 60 | Refills: 0 | OUTPATIENT
Start: 2017-09-20 | End: 2017-10-20

## 2017-09-20 NOTE — PROGRESS NOTE ADULT - ATTENDING COMMENTS
Refusing LP, Rehab.  wants to go home and discuss with PMD.  Discharge home - continue BB, Primidone
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
## 2017-09-20 NOTE — PROGRESS NOTE ADULT - PROBLEM SELECTOR PLAN 1
Reviewed recent MRI scans in detail with Dr Delacruz from Neuro rad.  D/w pt and girlfriend results in detail and advised them of doing Spinal Tap/LP and explained the procedure and pros/cons in detail and pt doesn't wish to do such procedure at this time.  He understands our limitation of not being able to know if any MS/infections/mass/demyelination there.  D/w FP team.  Reconsult PRN.
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
## 2017-09-20 NOTE — PROGRESS NOTE ADULT - PROBLEM SELECTOR PLAN 2
Bed alarm in place.   Pt warned to call for help prior to attempting to get up.   F/U MRI head and cervical spine w/contrast completed and provide no further information. F/U w/Dr. Amaral - neuro

## 2017-09-20 NOTE — PROGRESS NOTE ADULT - SUBJECTIVE AND OBJECTIVE BOX
INTERVAL HISTORY:  Seen at bedside; comfortable; Denies any new complaints.    No Known Allergies      VITAL SIGNS:  Vital Signs Last 24 Hrs  T(C): 36.8 (20 Sep 2017 09:23), Max: 36.8 (19 Sep 2017 23:26)  T(F): 98.2 (20 Sep 2017 09:23), Max: 98.2 (19 Sep 2017 23:26)  HR: 62 (20 Sep 2017 09:23) (59 - 62)  BP: 116/65 (20 Sep 2017 09:23) (97/53 - 116/65)  BP(mean): --  RR: 18 (20 Sep 2017 09:23) (18 - 20)  SpO2: 97% (20 Sep 2017 09:23) (97% - 97%)    PHYSICAL EXAMINATION:  General: Well-developed, well nourished, in no acute distress.  Eyes: Conjunctiva and sclera clear.  Neurologic:  - Mental Status:  Alert, awake, oriented to person, place, and time; Speech is normal; Affect is normal.  - Cranial Nerves: II-XI intact.  - Motor:  Strength is 5/5 throughout.  There is no pronator drift.  Normal muscle bulk and tone throughout.  - Reflexes:  2+ throughout  - Sensory:  Intact to light touch, pin prick, vibration, and joint-position sense throughout.  - Coordination:  No dysmetria/dysdiadochokinesis.    MEDS:  MEDICATIONS  (STANDING):  enoxaparin Injectable 40 milliGRAM(s) SubCutaneous daily  propranolol 40 milliGRAM(s) Oral two times a day  primidone 25 milliGRAM(s) Oral at bedtime    MEDICATIONS  (PRN):      LABS:        Sedimentation Rate, Erythrocyte: 4 mm/hr (09.19.17 @ 07:52)        RADIOLOGY & ADDITIONAL STUDIES:      MRI Head w/Cont (09.19.17 @ 16:24) >  There is normal physiologic enhancement. Please refer to prior exam for   description of white matter changes and differential considerations.      MRI Cervical Spine w/Cont (09.19.17 @ 16:24) >  There is normal physiologic enhancement. Please refer to the precontrast   exam for description of spinal cord and disc changes and differential   considerations.          IMPRESSION:  H/o TBI with  tremors now.

## 2017-09-20 NOTE — PROGRESS NOTE ADULT - PROBLEM SELECTOR PLAN 1
likely secondary to intention tremor  Pt with ataxic gait. bed alarm in place. pt warned to call for help prior to attempting to get up.   Serum Folate/B12 WNL  ESR, CRP WNL  serum ceruloplasmin 24 wnl.  Continue propranolol 40 mg PO BID. Monitor B.P changes.  Continue Mysoline (primidone) 50 mg 1/2 tab (25mg) as per neuro recommendation.  Psych ruled out psychogenic causes.   MRI head done and neg for any cerebellar infarction.   MRI Head/cervical spine w/contrast provides no further information

## 2017-09-20 NOTE — PROGRESS NOTE ADULT - SUBJECTIVE AND OBJECTIVE BOX
Patient is a 42y Male who presents with a chief complaint of Tremors (16 Sep 2017 22:36).    Patient seen and examined at bedside, No acute overnight events. Pt says symptoms generally unimproved despite starting propranalol and prmidone. He says he continues to have tremors at rest and on reaching out to grasp things. He also c/o weakness of LLE more than R. He has no other complaint at this time. Pt denies fever, chest pain, SOB, abdominal pain, calf pain, extremity pain. Patient attempts ambulating by self though gait unsteady. Bed alarm in place to monitor pt.     ROS: Pt denies fever, chest pain, SOB, abdominal pain, calf pain.    Neuro Consult noted - will f/u with MRI Head/Cervical spine w/contrast. Attempted to talk w/Dr. Amaral S/P MRI w/contrast. will discuss this morning  PT Consult noted - D/C to Rehab or [facility] 3-5x    Vital Signs Last 24 Hrs  T(C): 36.8 (19 Sep 2017 23:26), Max: 36.9 (19 Sep 2017 07:39)  T(F): 98.2 (19 Sep 2017 23:26), Max: 98.5 (19 Sep 2017 07:39)  HR: 59 (20 Sep 2017 05:10) (57 - 61)  BP: 104/64 (20 Sep 2017 05:10) (97/53 - 110/64)  BP(mean): --  RR: 20 (19 Sep 2017 23:26) (18 - 20)  SpO2: 97% (19 Sep 2017 23:26) (97% - 97%)      PHYSICAL EXAM:  General: Appears well developed, well nourished alert and cooperative.  HEENT: Head; normocephalic, atraumatic.  Eyes: PERRLA, EOMI  Neck: Supple, no JVD or carotid bruit or thyromegaly.  CARDIOVASCULAR: Normal S1 and S2, No murmur, RRR.   LUNGS: No rales, rhonchi or wheeze. Normal breath sounds bilaterally.  ABDOMEN: Soft, nontender without mass or organomegaly. Nl BS  EXTREMITIES: No clubbing, cyanosis or edema. Distal pulses wnl.   SKIN: warm and dry with normal turgor.  NEURO: Alert/oriented x 3. Tremors noted at rest and worse with attempt to grasp. Reflexes intact. able to perform finger to nose test though with some difficulty. sensations intact. 4/5 muscle strength on B/L UE and RLE. 3/5 strength on LLE.      Labs:             Sedimentation Rate, Erythrocyte (09.19.17 @ 07:52)    Sedimentation Rate, Erythrocyte: 4 mm/hr    C-Reactive Protein, Serum (09.19.17 @ 07:52)    C-Reactive Protein, Serum: 0.10 mg/dL                         13.9   5.9   )-----------( 191      ( 18 Sep 2017 08:02 )             40.7   09-17    140  |  104  |  12.0  ----------------------------<  90  4.1   |  27.0  |  0.86    Ca    9.3      17 Sep 2017 07:04  Phos  2.9     09-17  Mg     1.9     09-17    TPro  6.7  /  Alb  4.2  /  TBili  0.6  /  DBili  x   /  AST  15  /  ALT  11  /  AlkPhos  59  09-17    MEDICATIONS  (STANDING):  enoxaparin Injectable 40 milliGRAM(s) SubCutaneous daily  propranolol 40 milliGRAM(s) Oral two times a day  primidone 25 milliGRAM(s) Oral at bedtime    MEDICATIONS  (PRN):      Radiology:     EXAM:  BRAIN (MRI) W O CON                          PROCEDURE DATE:  09/17/2017          INTERPRETATION:  CLINICAL HISTORY: tremor    COMPARISON: CT head dated 9/17/2017    TECHNIQUE: MRI brain: Multiplanar, multisequence MR imaging of the brain   are obtained without the administration of intravenous gadolinium.     FINDINGS: Limited by motion artifact.  There is no abnormal restricted diffusion to suggest acute infarction.   Scattered periventricular and subcortical white matter T2 /FLAIR   hyperintensities are seen without mass effect, nonspecific, likely   representing moderate to severe chronic microvascular changes. The   visualized hippocampal structures are symmetric in morphology and signal   intensity.    Normal T2 flow-voids are seen within  the intracranial vasculature. The   lateral ventricles and cortical sulci are age-appropriate in size and   configuration. There is no mass, mass effect, or extra-axial fluid   collection. There is no susceptibility artifact to suggest hemorrhage.   Midline structures are normal.  Mild polypoid inflammatory mucosal   changes are seen throughout the various portions of the paranasal   sinuses. The orbits and mastoid air cells are unremarkable.     IMPRESSION: No acute infarction. Microvascular changes. Sinusitis.    < from: MRI Cervical Spine w/o Cont (09.18.17 @ 10:18) >    IMPRESSION:    Multifocal areas of abnormal spinal cord hyperintensities. Differential   diagnosis includes infectious, inflammatory, autoimmune, demyelinating,   transverse myelitis, and demyelinating disease. Postcontrast images are   recommended.    < end of copied text >    < from: MRI Head w/Cont (09.19.17 @ 16:24) >  INTERPRETATION:  MRI brain with contrast  Comparison precontrast exam performed the prior day  History tremors    Contrast Gadavist 8 cc; 2 cc discarded    There is normal physiologic enhancement. Please refer to prior exam for   description of white matter changes and differential considerations.    < end of copied text >  < from: MRI Cervical Spine w/Cont (09.19.17 @ 16:24) >    INTERPRETATION:  MRI cervical spine with contrast    Comparison noncontrast exam performed the prior day    Contrast Gadavist 8 cc; 2 cc discarded    There is normal physiologic enhancement. Please refer to the precontrast   exam for description of spinal cord and disc changes and differential   considerations.    < end of copied text >    < from: MRI Cervical Spine w/o Cont (09.18.17 @ 10:18) >  TECHNIQUE: Cervical spine MRI: Multiplanar, multisequence MR images of   the cervical spine are obtained without administration of intravenous   gadolinium.    FINDINGS: Abnormal spinal cord T2/retrocardiac hyperintensity at the   craniocervical junction, C2, C3, and C4 levels. Diffuse low T1 marrow   signal likely representing red marrow hyperplasia. No bone marrow edema   or ligamentous edema. Mild disc desiccation at the C4/C5-C6/C7 levels.    There is no evidence for acute fracture. A normal lordosis is noted.   Craniocervical junction is normal. The cervicovertebral body heights and   intervertebral disc spaces are preserved. There is no prevertebral soft   tissue abnormality. The odontoid process is intact. The spinal canal and   foramen are widely patent. There is no evidence of significant disc   herniation. Thyroid gland is normal. The airway is patent. The upper lung   apices are unremarkable.    Evaluation of the individual levels:  C2/C3 level:    No disc herniation, spinal canal stenosis, or foraminal   narrowing.  C3/C4 level:    No disc herniation, spinal canal stenosis, or foraminal   narrowing.  C4/C5 level: Broad-based ridging causing mild bilateral foraminal   narrowing. No spinal canal stenosis.  C5/C6 level: Broad-based ridging causing moderate bilateral foraminal   narrowing. No spinal canal stenosis.  C6/C7 level:    No disc herniation, spinal canal stenosis, or foraminal   narrowing.  C7/T1 level:    No disc herniation, spinal canal stenosis, or foraminal   narrowing.    IMPRESSION:    Multifocal areas of abnormal spinal cord hyperintensities. Differential   diagnosis includes infectious, inflammatory, autoimmune, demyelinating,   transverse myelitis, and demyelinating disease. Postcontrast images are   recommended.    < end of copied text > Patient is a 42y Male who presents with a chief complaint of Tremors (16 Sep 2017 22:36).    Patient seen and examined at bedside with girlfriend, No acute overnight events. Pt says symptoms improved slightly since starting propranalol and prmidone. He has not noticed any side effects, no weakness, heart slowing, dizziness, change in mentation, sleepiness. He says he continues to have tremors at rest and on reaching out to grasp things. He also c/o weakness of LLE more than R which is unchanged. He has no other complaint at this time. Pt denies fever, chest pain, SOB, abdominal pain, calf pain, extremity pain. Patient attempts ambulating by self though gait unsteady. Bed alarm in place to monitor pt.     ROS: Pt denies fever, chest pain, SOB, abdominal pain, calf pain.    Neuro Consult noted - will f/u with Dr. Amaral about MRI Head/Cervical spine w/contrast. Attempted to talk w/Dr. Amaral S/P MRI w/contrast. will discuss this morning  PT Consult noted - D/C to Rehab or [facility] 3-5x    Vital Signs Last 24 Hrs  T(C): 36.8 (19 Sep 2017 23:26), Max: 36.9 (19 Sep 2017 07:39)  T(F): 98.2 (19 Sep 2017 23:26), Max: 98.5 (19 Sep 2017 07:39)  HR: 59 (20 Sep 2017 05:10) (57 - 61)  BP: 104/64 (20 Sep 2017 05:10) (97/53 - 110/64)  BP(mean): --  RR: 20 (19 Sep 2017 23:26) (18 - 20)  SpO2: 97% (19 Sep 2017 23:26) (97% - 97%)      PHYSICAL EXAM:  General: Appears well developed, well nourished alert and cooperative.  HEENT: Head; normocephalic, atraumatic.  Eyes: PERRLA, EOMI  Neck: Supple, no JVD or carotid bruit or thyromegaly.  CARDIOVASCULAR: Normal S1 and S2, No murmur, RRR.   LUNGS: No rales, rhonchi or wheeze. Normal breath sounds bilaterally.  ABDOMEN: Soft, nontender without mass or organomegaly. Nl BS  EXTREMITIES: No clubbing, cyanosis or edema. Distal pulses wnl.   SKIN: warm and dry with normal turgor.  NEURO: Alert/oriented x 3. Tremors noted at rest and worse with attempt to grasp. Reflexes intact. . sensations intact. 4/5 muscle strength on B/L UE and RLE. 3/5 strength on LLE. Gait tested and unchanged, wide based gait with tremors. Pt occasionally has to catch his balance by holding onto wall or person.      Labs:             Sedimentation Rate, Erythrocyte (09.19.17 @ 07:52)    Sedimentation Rate, Erythrocyte: 4 mm/hr    C-Reactive Protein, Serum (09.19.17 @ 07:52)    C-Reactive Protein, Serum: 0.10 mg/dL                         13.9   5.9   )-----------( 191      ( 18 Sep 2017 08:02 )             40.7   09-17    140  |  104  |  12.0  ----------------------------<  90  4.1   |  27.0  |  0.86    Ca    9.3      17 Sep 2017 07:04  Phos  2.9     09-17  Mg     1.9     09-17    TPro  6.7  /  Alb  4.2  /  TBili  0.6  /  DBili  x   /  AST  15  /  ALT  11  /  AlkPhos  59  09-17    MEDICATIONS  (STANDING):  enoxaparin Injectable 40 milliGRAM(s) SubCutaneous daily  propranolol 40 milliGRAM(s) Oral two times a day  primidone 25 milliGRAM(s) Oral at bedtime    MEDICATIONS  (PRN):      Radiology:     EXAM:  BRAIN (MRI) W O CON                          PROCEDURE DATE:  09/17/2017          INTERPRETATION:  CLINICAL HISTORY: tremor    COMPARISON: CT head dated 9/17/2017    TECHNIQUE: MRI brain: Multiplanar, multisequence MR imaging of the brain   are obtained without the administration of intravenous gadolinium.     FINDINGS: Limited by motion artifact.  There is no abnormal restricted diffusion to suggest acute infarction.   Scattered periventricular and subcortical white matter T2 /FLAIR   hyperintensities are seen without mass effect, nonspecific, likely   representing moderate to severe chronic microvascular changes. The   visualized hippocampal structures are symmetric in morphology and signal   intensity.    Normal T2 flow-voids are seen within  the intracranial vasculature. The   lateral ventricles and cortical sulci are age-appropriate in size and   configuration. There is no mass, mass effect, or extra-axial fluid   collection. There is no susceptibility artifact to suggest hemorrhage.   Midline structures are normal.  Mild polypoid inflammatory mucosal   changes are seen throughout the various portions of the paranasal   sinuses. The orbits and mastoid air cells are unremarkable.     IMPRESSION: No acute infarction. Microvascular changes. Sinusitis.    < from: MRI Cervical Spine w/o Cont (09.18.17 @ 10:18) >    IMPRESSION:    Multifocal areas of abnormal spinal cord hyperintensities. Differential   diagnosis includes infectious, inflammatory, autoimmune, demyelinating,   transverse myelitis, and demyelinating disease. Postcontrast images are   recommended.    < end of copied text >    < from: MRI Head w/Cont (09.19.17 @ 16:24) >  INTERPRETATION:  MRI brain with contrast  Comparison precontrast exam performed the prior day  History tremors    Contrast Gadavist 8 cc; 2 cc discarded    There is normal physiologic enhancement. Please refer to prior exam for   description of white matter changes and differential considerations.    < end of copied text >  < from: MRI Cervical Spine w/Cont (09.19.17 @ 16:24) >    INTERPRETATION:  MRI cervical spine with contrast    Comparison noncontrast exam performed the prior day    Contrast Gadavist 8 cc; 2 cc discarded    There is normal physiologic enhancement. Please refer to the precontrast   exam for description of spinal cord and disc changes and differential   considerations.    < end of copied text >    < from: MRI Cervical Spine w/o Cont (09.18.17 @ 10:18) >  TECHNIQUE: Cervical spine MRI: Multiplanar, multisequence MR images of   the cervical spine are obtained without administration of intravenous   gadolinium.    FINDINGS: Abnormal spinal cord T2/retrocardiac hyperintensity at the   craniocervical junction, C2, C3, and C4 levels. Diffuse low T1 marrow   signal likely representing red marrow hyperplasia. No bone marrow edema   or ligamentous edema. Mild disc desiccation at the C4/C5-C6/C7 levels.    There is no evidence for acute fracture. A normal lordosis is noted.   Craniocervical junction is normal. The cervicovertebral body heights and   intervertebral disc spaces are preserved. There is no prevertebral soft   tissue abnormality. The odontoid process is intact. The spinal canal and   foramen are widely patent. There is no evidence of significant disc   herniation. Thyroid gland is normal. The airway is patent. The upper lung   apices are unremarkable.    Evaluation of the individual levels:  C2/C3 level:    No disc herniation, spinal canal stenosis, or foraminal   narrowing.  C3/C4 level:    No disc herniation, spinal canal stenosis, or foraminal   narrowing.  C4/C5 level: Broad-based ridging causing mild bilateral foraminal   narrowing. No spinal canal stenosis.  C5/C6 level: Broad-based ridging causing moderate bilateral foraminal   narrowing. No spinal canal stenosis.  C6/C7 level:    No disc herniation, spinal canal stenosis, or foraminal   narrowing.  C7/T1 level:    No disc herniation, spinal canal stenosis, or foraminal   narrowing.    IMPRESSION:    Multifocal areas of abnormal spinal cord hyperintensities. Differential   diagnosis includes infectious, inflammatory, autoimmune, demyelinating,   transverse myelitis, and demyelinating disease. Postcontrast images are   recommended.    < end of copied text >

## 2017-09-20 NOTE — CHART NOTE - NSCHARTNOTEFT_GEN_A_CORE
Discussed the benefits and risks of a lumbar puncture to patient. He chose not to have the procedure. He also understands the benefits of going to rehab for one week for PT. He would like to be discharged home with home care.    Dr. Amezcua

## 2017-09-20 NOTE — PROGRESS NOTE ADULT - PROBLEM SELECTOR PROBLEM 3
TBI (traumatic brain injury)

## 2017-09-20 NOTE — PROGRESS NOTE ADULT - PROBLEM SELECTOR PROBLEM 1
TBI (traumatic brain injury)
Tremor, coarse

## 2017-09-28 LAB
COPPER ?TM UR-MCNC: SIGNIFICANT CHANGE UP
COPPER UR-MCNC: 1850 — SIGNIFICANT CHANGE UP

## 2018-07-13 ENCOUNTER — EMERGENCY (EMERGENCY)
Facility: HOSPITAL | Age: 43
LOS: 1 days | End: 2018-07-13
Payer: OTHER GOVERNMENT

## 2018-07-13 PROCEDURE — 99284 EMERGENCY DEPT VISIT MOD MDM: CPT

## 2018-07-13 PROCEDURE — 72125 CT NECK SPINE W/O DYE: CPT | Mod: 26

## 2018-07-13 PROCEDURE — 70450 CT HEAD/BRAIN W/O DYE: CPT | Mod: 26

## 2022-01-23 PROBLEM — R25.1 TREMOR, UNSPECIFIED: Chronic | Status: ACTIVE | Noted: 2017-09-16
